# Patient Record
Sex: FEMALE | Race: OTHER
[De-identification: names, ages, dates, MRNs, and addresses within clinical notes are randomized per-mention and may not be internally consistent; named-entity substitution may affect disease eponyms.]

---

## 2017-05-04 ENCOUNTER — HOSPITAL ENCOUNTER (EMERGENCY)
Dept: HOSPITAL 72 - EMR | Age: 32
Discharge: HOME | End: 2017-05-04
Payer: MEDICAID

## 2017-05-04 VITALS — DIASTOLIC BLOOD PRESSURE: 78 MMHG | SYSTOLIC BLOOD PRESSURE: 118 MMHG

## 2017-05-04 VITALS — BODY MASS INDEX: 24.84 KG/M2 | HEIGHT: 62 IN | WEIGHT: 135 LBS
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
## 2017-05-04 DIAGNOSIS — S91.202A: Primary | ICD-10-CM

## 2017-05-04 DIAGNOSIS — Z23: ICD-10-CM
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
## 2017-05-04 PROCEDURE — 90715 TDAP VACCINE 7 YRS/> IM: CPT

## 2017-05-04 PROCEDURE — 90471 IMMUNIZATION ADMIN: CPT

## 2017-05-04 PROCEDURE — 96372 THER/PROPH/DIAG INJ SC/IM: CPT

## 2017-05-04 PROCEDURE — 99284 EMERGENCY DEPT VISIT MOD MDM: CPT

## 2017-05-04 NOTE — EMERGENCY ROOM REPORT
History of Present Illness


General


Chief Complaint:  Lower Extremity Injury


Source:  Patient





Present Illness


HPI


32-year-old female presents emergency department complaining of bleeding from 

the nail of the left great toe times one hour.  Patient states that the corner 

of her toenail got stuck in the carpet when she was attempting to move a 

mattress and the nail was partially toren off.  Patient reports mild tenderness 

and bleeding.  Patient states she applied Aloe-vera and tape to bed she of the 

nail down with a Band-Aid.  Patient states she's not UTD with tetanus 

vaccination.  She denies taking blood thinning medications, she denies taking 

medications for her pain.  She states her pain is approximately 5/10 in 

severity localized to the left great toenail.  She states that the nail did not 

completely tear off. Denies numbness tingling or loss of sensation or gross 

motor movements of the extremities, incontinence of bowel or bladder. Denies CP

, Palpitations, LOC, AMS, dizziness, Changes in Vision, Sensation, paresthesias

, or a sudden severe headache.


Allergies:  


Coded Allergies:  


     No Known Allergies (Unverified , 5/4/14)





Patient History


Past Medical History:  see triage record


Past Surgical History:  none


Pertinent Family History:  none


Last Menstrual Period:  4/16/17


Pregnant Now:  No


Reviewed Nursing Documentation:  PMH: Agreed, PSxH: Agreed





Nursing Documentation-PMH


Past Medical History:  No Stated History





Review of Systems


All Other Systems:  negative except mentioned in HPI





Physical Exam





Vital Signs








  Date Time  Temp Pulse Resp B/P Pulse Ox O2 Delivery O2 Flow Rate FiO2


 


5/4/17 17:47 98.2 88 16 120/87 100 Room Air  








Sp02 EP Interpretation:  reviewed, normal


General Appearance:  no apparent distress, alert, GCS 15, non-toxic


Head:  normocephalic, atraumatic


Eyes:  bilateral eye PERRL, bilateral eye normal inspection


ENT:  hearing grossly normal, normal pharynx, no angioedema, normal voice


Neck:  full range of motion, supple/symm/no masses


Respiratory:  lungs clear, normal breath sounds, speaking full sentences


Cardiovascular #1:  regular rate, rhythm, no edema


Musculoskeletal:  back normal, gait/station normal, normal range of motion, 

other - mild ttp when lifting the lateral edge of the great toe nail, no bony 

ttp.


Neurologic:  alert, oriented x3, responsive, motor strength/tone normal, 

sensory intact, normal gait, speech normal


Psychiatric:  judgement/insight normal, memory normal, mood/affect normal


Skin:  normal color, no rash, warm/dry, well hydrated, other - partial toe nail 

avulsion of the left great toe nail, the nail continues to be intact, the base 

of the toe nail is unaffected. no current subungual hematoma, there is bleeding 

present at the distal end of the nail.  I do not suspect nail-bed laceration at 

this time, the teas is approximately 1/4 of the length of the nail closest to 

the distal edge, and the tear reaches the midline. the remaining nail is intact.





Medical Decision Making


PA Attestation


Dr. Banerjee is my supervising Physician whom patient management has been 

discussed with.


Diagnostic Impression:  


 Primary Impression:  


 Nail avulsion of toe


 Qualified Codes:  S91.209A - Unspecified open wound of unspecified toe(s) with 

damage to nail, initial encounter


ER Course


Pt. presents to the ED c/o  left toe nail pain x 1 day, toe nail was caught on 

the carpet and partially was torn off while attempting to move a mattress. 





Ddx considered but are not limited to Fracture, dislocation, contusion, nail 

avulsion , laceration Sprain/Strain/Spasm, eponychia, paronychia. 





Vital signs: are WNL, pt. is afebrile





H&PE are most consistent with partial toe nail avulsion of the left great toe 

nail, the nail continues to be intact, the base of the toe nail is unaffected. 

no current subungual hematoma, there is bleeding present at the distal end of 

the nail.  I do not suspect nail-bed laceration at this time, the teas is 

approximately 1/4 of the length of the nail closest to the distal edge, and the 

tear reaches the midline. the remaining nail is intact.





ORDERS: at this time as the diagnosis is clinical. 





ED INTERVENTIONS: 





-Tetanus vaccination is administered. 


-nail is cleaned , and sterile bandage is applied. 








DISCHARGE: At this time pt. is stable for d/c to home. Will provide printed 

patient care instructions, and any necessary prescriptions. Care plan and 

follow up instructions have been discussed with the patient prior to discharge.





Last Vital Signs








  Date Time  Temp Pulse Resp B/P Pulse Ox O2 Delivery O2 Flow Rate FiO2


 


5/4/17 17:47 98.2 88 16 120/87 100 Room Air  








Disposition:  HOME, SELF-CARE


Condition:  Stable


Scripts


Acetaminophen* (TYLENOL EXTRA STRENGTH*) 500 Mg Tablet


500 MG ORAL Q6H, #20 TAB 0 Refills


   Prov: Ciarra Finnegan         5/4/17 


Cephalexin* (KEFLEX*) 500 Mg Capsule


500 MG ORAL EVERY 12 HOURS for 7 Days, #14 CAP 0 Refills


   Prov: Ciarra Finnegan         5/4/17


Patient Instructions:  Nail Avulsion





Additional Instructions:  


Take medications as directed. 


Follow up with PCP in 3-5 days 


Return sooner to ED if new symptoms occur, or current symptoms become worse. 











** Review provided list of  health clinics  for follow up **





- Please note that this Emergency Department Report was dictated using [employer] technology software, occasionally this can lead to 

erroneous entry secondary to interpretation by the dictation equipment.











Ciarra Finnegan May 4, 2017 18:07

## 2017-09-08 ENCOUNTER — HOSPITAL ENCOUNTER (EMERGENCY)
Dept: HOSPITAL 72 - EMR | Age: 32
Discharge: HOME | End: 2017-09-08
Payer: MEDICAID

## 2017-09-08 VITALS — DIASTOLIC BLOOD PRESSURE: 68 MMHG | SYSTOLIC BLOOD PRESSURE: 106 MMHG

## 2017-09-08 VITALS — BODY MASS INDEX: 23.92 KG/M2 | WEIGHT: 135 LBS | HEIGHT: 63 IN

## 2017-09-08 VITALS — DIASTOLIC BLOOD PRESSURE: 67 MMHG | SYSTOLIC BLOOD PRESSURE: 114 MMHG

## 2017-09-08 DIAGNOSIS — J06.9: Primary | ICD-10-CM

## 2017-09-08 DIAGNOSIS — B34.9: ICD-10-CM

## 2017-09-08 PROCEDURE — 99282 EMERGENCY DEPT VISIT SF MDM: CPT

## 2017-09-08 NOTE — EMERGENCY ROOM REPORT
History of Present Illness


General


Chief Complaint:  Flu Like Symptoms


Source:  Patient





Present Illness


HPI


32-year-old female no significant past medical history presenting with 3 days 

of runny nose and cough.  Patient states clear nasal discharge from the nose, 

that sometimes blocks her nasal passages.  Also complaining of mild cough 

productive with clear sputum.  Complaining of subjective fevers.  No chills.  

Also complaining of sore throat.  Patient states that she has still been able 

to eat and drink normally.  Denies any chest pain shortness of breath abdominal 

pain nausea vomiting or diarrhea.  No sick contacts or recent travel


Allergies:  


Coded Allergies:  


     No Known Allergies (Unverified , 5/4/14)





Patient History


Past Medical History:  see triage record


Past Surgical History:  none


Pertinent Family History:  none


Last Menstrual Period:  8/25/17


Pregnant Now:  No


Reviewed Nursing Documentation:  PMH: Agreed, PSxH: Agreed





Nursing Documentation-PMH


Past Medical History:  No Stated History





Review of Systems


All Other Systems:  negative except mentioned in HPI





Physical Exam





Vital Signs








  Date Time  Temp Pulse Resp B/P (MAP) Pulse Ox O2 Delivery O2 Flow Rate FiO2


 


9/8/17 10:33 97.9 94 16 106/68 98 Room Air  








Sp02 EP Interpretation:  reviewed, normal


General Appearance:  normal inspection, well appearing, no apparent distress, 

alert, GCS 15, non-toxic


Head:  normocephalic, atraumatic


Eyes:  bilateral eye normal inspection, bilateral eye PERRL, bilateral eye EOMI


ENT:  normal voice, moist mucus membranes, other - Posterior pharynx with mild 

erythema, no exudates, no tonsillar or uvula enlargement


Neck:  normal inspection, full range of motion, supple


Respiratory:  normal inspection, lungs clear, normal breath sounds, no 

respiratory distress, no retraction, no wheezing, speaking full sentences, 

chest symmetrical


Cardiovascular #1:  normal inspection, regular rate, rhythm, no edema, normal 

capillary refill


Cardiovascular #2:  2+ radial (R), 2+ radial (L)


Gastrointestinal:  normal inspection, non tender, soft, non-distended, no 

guarding


Musculoskeletal:  normal inspection, back normal, normal range of motion, non-

tender


Neurologic:  normal inspection, alert, oriented x3, responsive, motor strength/

tone normal, sensory intact, normal gait, speech normal


Psychiatric:  normal inspection, judgement/insight normal, memory normal


Skin:  normal inspection, normal color, no rash, warm/dry, well hydrated, 

normal turgor





Medical Decision Making


Diagnostic Impression:  


 Primary Impression:  


 Viral URI with cough


ER Course


33 yo female with 3 days of cough and runny nose


DDX:


Patient likely to have viral syndrome.  Lungs are clear





Plan:


Supportive care





ER course:


Patient has remained stable during ED stay.








Disposition:


Patient is to be discharged to home.


Patient instructed to continue Motrin every 8 hours.  


Patient is instructed to follow up with their primary care doctor within 5 

days. 


Strict return precautions discussed with patient such as fever, chills, 

shortness of breath, nausea, vomiting, which may indicate severe illness. 

Patient verbalizes understanding and agrees with plan. 





Please note that this Emergency Department Report was dictated using [email or web address] technology software, occasionally this can lead to 

erroneous entry secondary to interpretation by the dictation equipment





Last Vital Signs








  Date Time  Temp Pulse Resp B/P (MAP) Pulse Ox O2 Delivery O2 Flow Rate FiO2


 


9/8/17 10:43  94 16   Room Air  


 


9/8/17 10:33 97.9   106/68 98   








Disposition:  HOME, SELF-CARE


Condition:  Improved











Kimberly Feng M.D. Sep 8, 2017 10:57

## 2017-10-01 ENCOUNTER — HOSPITAL ENCOUNTER (EMERGENCY)
Dept: HOSPITAL 72 - EMR | Age: 32
Discharge: HOME | End: 2017-10-01
Payer: MEDICAID

## 2017-10-01 VITALS — WEIGHT: 140 LBS | BODY MASS INDEX: 25.76 KG/M2 | HEIGHT: 62 IN

## 2017-10-01 VITALS — SYSTOLIC BLOOD PRESSURE: 109 MMHG | DIASTOLIC BLOOD PRESSURE: 82 MMHG

## 2017-10-01 VITALS — DIASTOLIC BLOOD PRESSURE: 78 MMHG | SYSTOLIC BLOOD PRESSURE: 114 MMHG

## 2017-10-01 VITALS — SYSTOLIC BLOOD PRESSURE: 114 MMHG | DIASTOLIC BLOOD PRESSURE: 78 MMHG

## 2017-10-01 DIAGNOSIS — R07.89: Primary | ICD-10-CM

## 2017-10-01 DIAGNOSIS — I10: ICD-10-CM

## 2017-10-01 PROCEDURE — 99284 EMERGENCY DEPT VISIT MOD MDM: CPT

## 2017-10-01 PROCEDURE — 93005 ELECTROCARDIOGRAM TRACING: CPT

## 2017-10-01 NOTE — EMERGENCY ROOM REPORT
History of Present Illness


General


Chief Complaint:  Pain


Source:  Patient





Present Illness


HPI


Patient presents to 2 weeks substernal chest pain.  It's worse when she lays 

down and also a when she is about to go to bed at night.  She sometimes feels 

it in the morning.  She rates it 5/10.  It feels like pressure to her.  Does 

not radiate.  Denies any fevers, cough, sore throat.  Sometimes when she eats 

the pain gets better.  She denies melena or vomiting blood.





The patient denies diabetes, hypertension, high cholesterol, smoking or family 

history of heart disease.  Her father does have hypertension.





She denies being pregnant at this time denies dysuria.  No polies.





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


Allergies:  


Coded Allergies:  


     No Known Allergies (Unverified , 5/4/14)





Patient History


Past Medical History:  see triage record


Pertinent Family History:  HTN


Social History:  Denies: smoking, alcohol use, drug use


Social History Narrative


She teaches meditation


Last Menstrual Period:  last week


Pregnant Now:  No


Reviewed Nursing Documentation:  PMH: Agreed, PSxH: Agreed





Nursing Documentation-PMH


Past Medical History:  No Stated History





Review of Systems


All Other Systems:  negative except mentioned in HPI





Physical Exam





Vital Signs








  Date Time  Temp Pulse Resp B/P (MAP) Pulse Ox O2 Delivery O2 Flow Rate FiO2


 


10/1/17 07:43 98.2 97 18 111/63 98 Room Air  








Sp02 EP Interpretation:  reviewed, normal


General Appearance:  well appearing, no apparent distress, GCS 15


Head:  normocephalic


Eyes:  bilateral eye normal inspection, bilateral eye PERRL


ENT:  moist mucus membranes


Neck:  supple


Respiratory:  lungs clear, normal breath sounds


Cardiovascular #1:  regular rate, rhythm


Cardiovascular #2:  2+ radial (R)


Gastrointestinal:  normal inspection, normal bowel sounds, non tender, no mass, 

non-distended


Musculoskeletal:  back normal, gait/station normal, normal range of motion


Neurologic:  alert, oriented x3, grossly normal


Psychiatric:  mood/affect normal


Skin:  normal inspection, warm/dry





Medical Decision Making


Diagnostic Impression:  


 Primary Impression:  


 Chest pain


 Qualified Codes:  R07.9 - Chest pain, unspecified


ER Course


The patient presents with nonexertional chest pressure appears worse when she 

lays down.  Differential includes GERD, esophageal spasm and cardiac cause.  

EKG is be obtained in it.  History and VS against PE.  The patient will also 

get Mylanta and ranitidine.  Her cardiac risk factors are none.  No labs 

indicated.





EKG normal.





Improved with treatment.





Patient stable for outpatient observation and treatment.


EKG Diagnostic Results


Rate:  normal


Rhythm:  NSR


ST Segments:  no acute changes





Rhythm Strip Diag. Results


EP Interpretation:  yes


Rhythm:  NSR, no PVC's, no ectopy





Last Vital Signs








  Date Time  Temp Pulse Resp B/P (MAP) Pulse Ox O2 Delivery O2 Flow Rate FiO2


 


10/1/17 09:05 98.3 88 19 114/78 99 Room Air  








Status:  improved


Disposition:  HOME, SELF-CARE


Condition:  Improved


Scripts


Famotidine (PEPCID) 20 Mg Tablet


20 MG ORAL DAILY, #30 TAB 0 Refills


   Prov: Josh Cheung M.D.         10/1/17 


Mag Hydrox/Al Hydrox/Simeth (MAALOX MAXIMUM STRENGTH SUSP) 355 Ml Oral.susp


30 ML PO Q6HR, #355 ML


   Prov: Josh Cheung M.D.         10/1/17











Josh Cheung M.D. Oct 1, 2017 07:57

## 2017-10-02 NOTE — CARDIOLOGY REPORT
--------------- APPROVED REPORT --------------





EKG Measurement

Heart [id number]

MS [id number]

[id number]

[id number]

[id number]





Normal sinus rhythm

Normal ECG

## 2018-04-02 ENCOUNTER — HOSPITAL ENCOUNTER (EMERGENCY)
Dept: HOSPITAL 72 - EMR | Age: 33
Discharge: HOME | End: 2018-04-02
Payer: MEDICAID

## 2018-04-02 VITALS — DIASTOLIC BLOOD PRESSURE: 79 MMHG | SYSTOLIC BLOOD PRESSURE: 108 MMHG

## 2018-04-02 VITALS — BODY MASS INDEX: 25.76 KG/M2 | HEIGHT: 62 IN | WEIGHT: 140 LBS

## 2018-04-02 VITALS — SYSTOLIC BLOOD PRESSURE: 108 MMHG | DIASTOLIC BLOOD PRESSURE: 79 MMHG

## 2018-04-02 DIAGNOSIS — R07.89: Primary | ICD-10-CM

## 2018-04-02 PROCEDURE — 99283 EMERGENCY DEPT VISIT LOW MDM: CPT

## 2018-04-02 PROCEDURE — 93005 ELECTROCARDIOGRAM TRACING: CPT

## 2018-04-02 NOTE — CARDIOLOGY REPORT
--------------- APPROVED REPORT --------------





EKG Measurement

Heart [id number]

MS [id number]

[id number]

[id number]

[id number]





Normal sinus rhythm

Normal ECG

## 2018-04-02 NOTE — EMERGENCY ROOM REPORT
History of Present Illness


General


Chief Complaint:  Chest Pain


Source:  Patient





Present Illness


HPI


33-year-old female presents ED complaining of chest pain.  Has noted a pain on 

and off for the last 3 weeks.  Right sided, sharp, nonradiating, 5 out of 10.  

Patient states pain subsides as the day goes on.  Denies any shortness of 

breath.  Denies fevers chills.  Denies cough.  Patient states she had similar 

chest pain and was seen here a few months ago.  Denies any cardiac history.  

Denies smoking or drug use.  No other aggravating relieving factors.  Denies 

any other associated symptoms


Allergies:  


Coded Allergies:  


     No Known Allergies (Unverified , 5/4/14)





Patient History


Past Medical History:  none


Past Surgical History:  none


Pertinent Family History:  none


Social History:  Denies: smoking, alcohol use, drug use


Last Menstrual Period:  2nd week of march


Pregnant Now:  No


Immunizations:  UTD


Reviewed Nursing Documentation:  PMH: Agreed; PSxH: Agreed





Nursing Documentation-PMH


Past Medical History:  No Stated History





Review of Systems


All Other Systems:  negative except mentioned in HPI





Physical Exam





Vital Signs








  Date Time  Temp Pulse Resp B/P (MAP) Pulse Ox O2 Delivery O2 Flow Rate FiO2


 


4/2/18 06:59 98.5 85 16 108/79 95 Room Air  





 98.4       








Sp02 EP Interpretation:  reviewed, normal


General Appearance:  no apparent distress, alert, GCS 15, non-toxic


Head:  normocephalic, atraumatic


Eyes:  bilateral eye normal inspection, bilateral eye PERRL


ENT:  hearing grossly normal, normal pharynx, no angioedema, normal voice


Neck:  full range of motion, supple/symm/no masses


Respiratory:  lungs clear, normal breath sounds, speaking full sentences, other 

- reproducible sternal pain


Cardiovascular #1:  regular rate, rhythm, no edema


Cardiovascular #2:  2+ carotid (R), 2+ carotid (L), 2+ radial (R), 2+ radial (L)

, 2+ dorsalis pedis (R), 2+ dorsalis pedis (L)


Gastrointestinal:  normal bowel sounds, non tender, soft, non-distended, no 

guarding, no rebound


Rectal:  deferred


Genitourinary:  normal inspection, no CVA tenderness


Musculoskeletal:  back normal, gait/station normal, normal range of motion, non-

tender


Neurologic:  alert, oriented x3, responsive, motor strength/tone normal, 

sensory intact, speech normal


Psychiatric:  judgement/insight normal, memory normal, mood/affect normal, no 

suicidal/homicidal ideation


Reflexes:  3+ bicep (R), 3+ bicep (L), 3+ tricep (R), 3+ tricep (L), 3+ knee (R)

, 3+ knee (L)


Skin:  normal color, no rash, warm/dry, well hydrated


Lymphatic:  no adenopathy





Medical Decision Making


Diagnostic Impression:  


 Primary Impression:  


 Chest wall pain


ER Course


Hospital Course 


33-year-old female presents ED complaining of reproducible chest wall pain 





Differential diagnoses include: Rib fracture, MI/unstable angina, contusion, 

muscle strain





Clinical course


Patient placed on stretcher.  After initial history, physical exam reveals 

female in no acute distress.  Lungs clear.  Heart sounds normal.  There is some 

midsternal reproducible pain.  No bruising or crepitus.





EKG shows normal sinus rhythm no acute ischemic changes interpreted by me





clinical findings consistent with muscle strain/costochondritis.  discussed 

findings with patient.  Given no cardiac risk factors I do not believe further 

workup is required at this time.





I.  I feel this is a highly complex case requiring extensive working including 

EKG/Rhythm strip, Xray/CT/US, Blood/urine lab work, repeat exams while in ED, 

and administration of strong opiates/narcotics for pain control, admission to 

hospital or close patient follow up.  





Diagnosis - chest wall pain 





Stable and discharged to home with prescription for Tylenol.  Instructed to 

followup with PMD.  Return to ED if symptoms recur or worsen my chest wall pain 

shortness


EKG Diagnostic Results


Rate:  normal


Rhythm:  NSR


ST Segments:  no acute changes


ASA given to the pt in ED:  No





Rhythm Strip Diag. Results


EP Interpretation:  yes


Rhythm:  NSR, no PVC's, no ectopy





Last Vital Signs








  Date Time  Temp Pulse Resp B/P (MAP) Pulse Ox O2 Delivery O2 Flow Rate FiO2


 


4/2/18 07:31 98.4  16 108/79 95 Room Air  


 


4/2/18 07:05  85      








Status:  improved


Disposition:  HOME, SELF-CARE


Condition:  Stable


Scripts


Acetaminophen* (TYLENOL EXTRA STRENGTH*) 500 Mg Tablet


500 MG ORAL Q8H PRN for Prn Headache/Temp > 101, #30 TAB 0 Refills


   Prov: Mich Rust MD         4/2/18


Referrals:  


NOT CHOSEN [facility]/MD,REFERRING


Patient Instructions:  Chest Wall Pain, Easy-to-Read











Mich Rust MD Apr 2, 2018 08:11

## 2021-02-02 ENCOUNTER — HOSPITAL ENCOUNTER (EMERGENCY)
Dept: HOSPITAL 72 - EMR | Age: 36
Discharge: HOME | End: 2021-02-02
Payer: MEDICAID

## 2021-02-02 VITALS — WEIGHT: 140 LBS | BODY MASS INDEX: 25.76 KG/M2 | HEIGHT: 62 IN

## 2021-02-02 VITALS — DIASTOLIC BLOOD PRESSURE: 68 MMHG | SYSTOLIC BLOOD PRESSURE: 134 MMHG

## 2021-02-02 VITALS — DIASTOLIC BLOOD PRESSURE: 74 MMHG | SYSTOLIC BLOOD PRESSURE: 126 MMHG

## 2021-02-02 DIAGNOSIS — N76.4: Primary | ICD-10-CM

## 2021-02-02 PROCEDURE — 99282 EMERGENCY DEPT VISIT SF MDM: CPT

## 2021-02-02 PROCEDURE — 10060 I&D ABSCESS SIMPLE/SINGLE: CPT

## 2021-02-02 NOTE — EMERGENCY ROOM REPORT
History of Present Illness


General


Chief Complaint:  Skin Rash/Abscess


Source:  Patient





Present Illness


HPI


Patient is a 36-year-old female who presents for a left labial lesion.  Reports 

of increased pain and swelling over the past few days.  Denies any fever.Gradual

onset.  Reports having moderate pain.  Denies any sexual activity.  Denies any 

other locations of similar lesions.  No similar symptoms in the past.  No prior 

medical history.


Allergies:  


Coded Allergies:  


     No Known Allergies (Unverified , 5/4/14)





COVID-19 Screening


Contact w/high risk pt:  No


Experienced COVID-19 symptoms?:  No


COVID-19 Testing performed PTA:  No





Patient History


Pregnant Now:  No


Reviewed Nursing Documentation:  PMH: Agreed; PSxH: Agreed





Nursing Documentation-PMH


Past Medical History:  No History, Except For





Review of Systems


All Other Systems:  negative except mentioned in HPI





Physical Exam





Vital Signs








  Date Time  Temp Pulse Resp B/P (MAP) Pulse Ox O2 Delivery O2 Flow Rate FiO2


 


2/2/21 10:39 98.2 76 17 122/78 (93) 99 Room Air  








General Appearance:  well appearing, no apparent distress


Head:  normocephalic, atraumatic


ENT:  hearing grossly normal, normal voice


Neck:  full range of motion, supple


Respiratory:  no respiratory distress, speaking full sentences


Genitourinary:  other - left labial skin lesion less then 1 cm


Musculoskeletal:  no calf tenderness


Neurologic:  alert, motor strength/tone normal, CNs III-XII nml as tested, 

normal gait


Psychiatric:  mood/affect normal


Skin:  no rash





Procedures


Incision and Drainage


Incision and Drainage :  


   Consent:  Emergent


   Site:  left labia


   Blade Size:  11


   I & D Procedure:  betadine prep, sterile drapes applied


   Wound Location:  pelvis


   Wound's Depth, Shape:  superficial


   Wound Length (cm):  0


   Irrigated w/ Saline (ccs):  0


   Anesthesia:  1% Lidocaine


   Patient Tolerated:  Well


   Complications:  None





Medical Decision Making


Diagnostic Impression:  


   Primary Impression:  


   Abscess


ER Course


Presents for skin rash.  Differential diagnosis included was not limited to 

abscess, Bartholins cyst among others.  Patient was advised risk benefits and 

alternatives of incision and drainage.  Patient's skin abscess was incised and 

drained.  This appears to be hair follicle infection.  Patient be given 

prescription for  antibiotics.  patient was advised have the wound rechecked in 

2 to 3 days.  She is to return if worse.  This medical record is generated with 

Dragon transcription software. There may be some transcription discrepancies 

related to use of this software





Last Vital Signs








  Date Time  Temp Pulse Resp B/P (MAP) Pulse Ox O2 Delivery O2 Flow Rate FiO2


 


2/2/21 10:53 98.2 18 18 126/74 97 Room Air  








Status:  improved


Disposition:  HOME, SELF-CARE


Condition:  Stable











Alberto Mckeon MD                Feb 2, 2021 12:25

## 2021-02-02 NOTE — NUR
ED Nurse Note:

Pt walked into ED for vaginal abscess. There is no drainage. Pain is 2/2 
vaginal area, slight itchiness. Pt is alert and orientedx4, ambulatory. Pt has 
been seen by GEORGE.